# Patient Record
Sex: MALE | Race: BLACK OR AFRICAN AMERICAN | NOT HISPANIC OR LATINO | Employment: FULL TIME | URBAN - METROPOLITAN AREA
[De-identification: names, ages, dates, MRNs, and addresses within clinical notes are randomized per-mention and may not be internally consistent; named-entity substitution may affect disease eponyms.]

---

## 2022-01-31 PROCEDURE — 99282 EMERGENCY DEPT VISIT SF MDM: CPT

## 2022-02-01 ENCOUNTER — HOSPITAL ENCOUNTER (EMERGENCY)
Facility: HOSPITAL | Age: 37
Discharge: HOME | End: 2022-02-01
Attending: EMERGENCY MEDICINE | Admitting: EMERGENCY MEDICINE

## 2022-02-01 ENCOUNTER — APPOINTMENT (OUTPATIENT)
Dept: GENERAL RADIOLOGY | Facility: HOSPITAL | Age: 37
End: 2022-02-01

## 2022-02-01 VITALS
OXYGEN SATURATION: 99 % | SYSTOLIC BLOOD PRESSURE: 174 MMHG | DIASTOLIC BLOOD PRESSURE: 92 MMHG | HEART RATE: 94 BPM | TEMPERATURE: 97.9 F | RESPIRATION RATE: 20 BRPM

## 2022-02-01 DIAGNOSIS — M54.16 LUMBAR RADICULOPATHY, ACUTE: Primary | ICD-10-CM

## 2022-02-01 PROCEDURE — 25010000002 KETOROLAC TROMETHAMINE PER 15 MG: Performed by: EMERGENCY MEDICINE

## 2022-02-01 PROCEDURE — 73502 X-RAY EXAM HIP UNI 2-3 VIEWS: CPT

## 2022-02-01 PROCEDURE — 96372 THER/PROPH/DIAG INJ SC/IM: CPT

## 2022-02-01 RX ORDER — IBUPROFEN 600 MG/1
600 TABLET ORAL EVERY 8 HOURS PRN
Qty: 24 TABLET | Refills: 0 | Status: SHIPPED | OUTPATIENT
Start: 2022-02-01

## 2022-02-01 RX ORDER — METHOCARBAMOL 750 MG/1
750 TABLET, FILM COATED ORAL 3 TIMES DAILY PRN
Qty: 21 TABLET | Refills: 0 | Status: SHIPPED | OUTPATIENT
Start: 2022-02-01

## 2022-02-01 RX ORDER — KETOROLAC TROMETHAMINE 30 MG/ML
60 INJECTION, SOLUTION INTRAMUSCULAR; INTRAVENOUS ONCE
Status: COMPLETED | OUTPATIENT
Start: 2022-02-01 | End: 2022-02-01

## 2022-02-01 RX ORDER — METHYLPREDNISOLONE 4 MG/1
TABLET ORAL
Qty: 21 EACH | Refills: 0 | Status: SHIPPED | OUTPATIENT
Start: 2022-02-01

## 2022-02-01 RX ADMIN — KETOROLAC TROMETHAMINE 60 MG: 30 INJECTION, SOLUTION INTRAMUSCULAR; INTRAVENOUS at 02:35

## 2022-02-01 NOTE — ED NOTES
Pt to ED from home with c/o L hip pain x 2 weeks.  Pt reports pain starting after having intercourse, but denies injury that he knows of.    Pt wearing mask, staff wearing appropriate PPE.     Leena Mcdaniel RN  01/31/22 8629

## 2022-02-01 NOTE — DISCHARGE INSTRUCTIONS
Follow-up with the neurosurgeon and orthopedist regarding your pain.  Take the medication as prescribed.  Return here immediately for any sudden weakness or numbness of your leg or incontinence of bowel or bladder.

## 2022-02-01 NOTE — ED PROVIDER NOTES
EMERGENCY DEPARTMENT ENCOUNTER    Room Number:  20/20  Date of encounter:  2/1/2022  PCP: Provider, No Known  Patient Care Team:  Provider, No Known as PCP - General   Historian: Patient    HPI:  Chief Complaint: Left hip pain  A complete HPI/ROS/PMH/PSH/SH/FH are unobtainable due to: Nothing    Context: Kan Sharma is a 36 y.o. male who presents to the ED c/o left hip pain.  He reports 2 weeks ago he was having sexual intercourse in an acrobatic position and felt a sudden pain to his left hip.  He states since then he has had pain to his left hip.  It is worse with certain movements and in certain positions.  He has been taking over-the-counter Aleve without significant improvement.  He has no weakness or numbness.  He reports that his entire left hip has pain.  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  He states this seems to exacerbate his symptoms.  He has no weakness or numbness.  He has not had a fever.  He has been ambulatory.  He states the pain is gotten progressively worse.  He states he initially felt like he was having pain in his low back but it is all moved to his left hip.  He states the pain has been getting worse over the last day.    Prior record review: No prior records in our system.  Records from [facility] show that he visited there on 1/26/2022 and was diagnosed with lumbar radiculopathy.  He eloped.    PAST MEDICAL HISTORY  Active Ambulatory Problems     Diagnosis Date Noted   • No Active Ambulatory Problems     Resolved Ambulatory Problems     Diagnosis Date Noted   • No Resolved Ambulatory Problems     No Additional Past Medical History       The patient qualifies to receive the vaccine, but they have not yet received it.    PAST SURGICAL HISTORY  No past surgical history on file.      FAMILY HISTORY  No family history on file.      SOCIAL HISTORY  Social History     Socioeconomic History   • Marital status: Legally           ALLERGIES  Bleach [sodium hypochlorite]        REVIEW OF SYSTEMS  Review of Systems   No chest pain, no shortness of breath, no bowel incontinence, no bladder incontinence, no weakness, no numbness, no fever, no chills  All systems reviewed and negative except for those discussed in HPI.       PHYSICAL EXAM    I have reviewed the triage vital signs and nursing notes.    ED Triage Vitals [01/31/22 2251]   Temp Heart Rate Resp BP SpO2   97.9 °F (36.6 °C) (!) 123 22 (!) 194/112 98 %      Temp src Heart Rate Source Patient Position BP Location FiO2 (%)   Tympanic -- Sitting Right arm --       Physical Exam  GENERAL: Awake, alert, no acute distress  SKIN: Warm, dry  HENT: Normocephalic, atraumatic  EYES: no scleral icterus  CV: regular rhythm, regular rate  RESPIRATORY: normal effort, lungs clear  ABDOMEN: soft, nontender, nondistended  MUSCULOSKELETAL: no deformity.  Tenderness in the left medial and lateral hip.  Tenderness in the left low paraspinal region.  No midline tenderness.  Distal pulses strong.  Sensation and motor intact.  Pain with range of motion of the left hip in certain positions.  NEURO: alert, moves all extremities, follows commands          LAB RESULTS  No results found for this or any previous visit (from the past 24 hour(s)).    Ordered the above labs and independently reviewed the results.        RADIOLOGY  XR Hip With or Without Pelvis 2 - 3 View Left    Result Date: 2/1/2022  Patient: ALEXIS BLACKMON  Time Out: 00:59 Exam(s): FILM HIP + PELVIS EXAM:   XR Left Hip With Pelvis When Performed, 2 or 3 Views CLINICAL HISTORY:    Reason for exam: L hip pain. TECHNIQUE:   Two or three views of the left hip with pelvis when performed. COMPARISON:   No relevant prior studies available. FINDINGS:   Bones joints:  Unremarkable.  No acute fracture.  No dislocation.   Soft tissues:  Unremarkable. IMPRESSION:       Normal left hip x-rays.     Electronically signed by Alexis Mejía MD on 02-01-22 at  0059      I ordered the above noted radiological studies. Reviewed by me and discussed with radiologist.  See dictation for official radiology interpretation.      PROCEDURES    Procedures      MEDICATIONS GIVEN IN ER    Medications   ketorolac (TORADOL) injection 60 mg (has no administration in time range)         PROGRESS, DATA ANALYSIS, CONSULTS, AND MEDICAL DECISION MAKING    All labs have been independently reviewed by me.  All radiology studies have been reviewed by me and discussed with radiologist dictating the report.   EKG's independently viewed and interpreted by me.  Discussion below represents my analysis of pertinent findings related to patient's condition, differential diagnosis, treatment plan and final disposition.    Differential diagnosis includes but is not limited to lumbar radiculopathy, hip fracture, hip dislocation, septic hip.    ED Course as of 02/01/22 0156 Tue Feb 01, 2022 0154 He already appears much more comfortable and he has not had his Toradol yet. [TR]      ED Course User Index  [TR] Bjorn Warner MD           PPE: The patient wore a mask and I wore an N95 mask throughout the entire patient encounter.       AS OF 01:56 EST VITALS:    BP - (!) 194/112  HR - (!) 123  TEMP - 97.9 °F (36.6 °C) (Tympanic)  O2 SATS - 98%        DIAGNOSIS  Final diagnoses:   Lumbar radiculopathy, acute         DISPOSITION  ED Disposition     ED Disposition Condition Comment    Discharge Stable                 Bjorn Warner MD  02/01/22 0156

## 2023-11-08 ENCOUNTER — TRANSCRIBE ORDERS (OUTPATIENT)
Dept: ADMINISTRATIVE | Facility: HOSPITAL | Age: 38
End: 2023-11-08
Payer: COMMERCIAL

## 2023-11-08 DIAGNOSIS — J45.909 ASTHMA, UNSPECIFIED ASTHMA SEVERITY, UNSPECIFIED WHETHER COMPLICATED, UNSPECIFIED WHETHER PERSISTENT: Primary | ICD-10-CM

## 2023-12-07 ENCOUNTER — HOSPITAL ENCOUNTER (OUTPATIENT)
Dept: PULMONOLOGY | Facility: HOSPITAL | Age: 38
Discharge: HOME | End: 2023-12-07
Payer: MEDICAID

## 2023-12-07 DIAGNOSIS — J45.909 ASTHMA, UNSPECIFIED ASTHMA SEVERITY, UNSPECIFIED WHETHER COMPLICATED, UNSPECIFIED WHETHER PERSISTENT: ICD-10-CM

## 2023-12-07 PROCEDURE — 94726 PLETHYSMOGRAPHY LUNG VOLUMES: CPT

## 2023-12-07 PROCEDURE — 94729 DIFFUSING CAPACITY: CPT

## 2023-12-07 PROCEDURE — 94010 BREATHING CAPACITY TEST: CPT
